# Patient Record
Sex: FEMALE | Race: WHITE | Employment: FULL TIME | URBAN - METROPOLITAN AREA
[De-identification: names, ages, dates, MRNs, and addresses within clinical notes are randomized per-mention and may not be internally consistent; named-entity substitution may affect disease eponyms.]

---

## 2019-02-18 ENCOUNTER — TELEPHONE (OUTPATIENT)
Dept: INTERNAL MEDICINE CLINIC | Facility: CLINIC | Age: 34
End: 2019-02-18

## 2019-02-18 NOTE — TELEPHONE ENCOUNTER
Incoming (mail or fax):  mail  Received from:  [facility]  Documentation given to:  Randi Moore

## 2019-02-22 NOTE — TELEPHONE ENCOUNTER
Chart forwarded to [employer] 2-22-19 for subpoena for records from [facility]. .  See med rec scan dated 2-22-19.

## 2020-01-08 ENCOUNTER — HOSPITAL ENCOUNTER (OUTPATIENT)
Age: 35
Discharge: HOME | End: 2020-01-08
Attending: FAMILY MEDICINE
Payer: COMMERCIAL

## 2020-01-08 VITALS
OXYGEN SATURATION: 97 % | HEART RATE: 74 BPM | TEMPERATURE: 98 F | DIASTOLIC BLOOD PRESSURE: 83 MMHG | RESPIRATION RATE: 16 BRPM | SYSTOLIC BLOOD PRESSURE: 135 MMHG

## 2020-01-08 DIAGNOSIS — J40 BRONCHITIS: Primary | ICD-10-CM

## 2020-01-08 DIAGNOSIS — J02.9 PHARYNGITIS, UNSPECIFIED ETIOLOGY: ICD-10-CM

## 2020-01-08 LAB — POCT RAPID STREP: NEGATIVE

## 2020-01-08 PROCEDURE — 99204 OFFICE O/P NEW MOD 45 MIN: CPT

## 2020-01-08 PROCEDURE — 87081 CULTURE SCREEN ONLY: CPT | Performed by: FAMILY MEDICINE

## 2020-01-08 PROCEDURE — 87430 STREP A AG IA: CPT | Performed by: FAMILY MEDICINE

## 2020-01-08 PROCEDURE — 99214 OFFICE O/P EST MOD 30 MIN: CPT

## 2020-01-08 RX ORDER — PREDNISONE 20 MG/1
40 TABLET ORAL DAILY
Qty: 10 TABLET | Refills: 0 | Status: SHIPPED | OUTPATIENT
Start: 2020-01-08 | End: 2020-01-13

## 2020-01-08 RX ORDER — BENZONATATE 200 MG/1
200 CAPSULE ORAL 3 TIMES DAILY PRN
Qty: 30 CAPSULE | Refills: 0 | Status: SHIPPED | OUTPATIENT
Start: 2020-01-08 | End: 2020-01-18

## 2020-01-08 NOTE — ED INITIAL ASSESSMENT (HPI)
Patient presents with cc of sore throat and productive cough x 2 days. Symptoms started on Sunday with sore throat. No fever. No flu shot. Coworkers ill with URI. Anterior chest pain with coughing.

## 2020-01-11 NOTE — ED PROVIDER NOTES
Patient Seen in: [facility]      History   Patient presents with:  Sore Throat  Cough    Stated Complaint: SORETHROAT AND  CHEST PAIN X 3 DAYS    HPI    37year old female presents for sore throat, cough and chest pain.  States s vital signs reviewed. All other systems reviewed and negative except as noted above.     Physical Exam     ED Triage Vitals [01/08/20 1122]   /83   Pulse 74   Resp 16   Temp 98.4 °F (36.9 °C)   Temp src Temporal   SpO2 97 %   O2 Device None (Room [facility]  [address] [place] [id number] [phone]    In 3 days  If symptoms worsen        Medications Prescribed:  Discharge Medication List as of 1/8/2020 11:46 AM    START taking these medications    predniSONE 20 MG Oral Tab  Take 2 tablets

## 2020-09-22 ENCOUNTER — HOSPITAL ENCOUNTER (OUTPATIENT)
Age: 35
Discharge: HOME | End: 2020-09-22
Payer: COMMERCIAL

## 2020-09-22 VITALS
OXYGEN SATURATION: 99 % | HEIGHT: 67 IN | TEMPERATURE: 99 F | WEIGHT: 230 LBS | RESPIRATION RATE: 20 BRPM | HEART RATE: 63 BPM | BODY MASS INDEX: 36.1 KG/M2 | DIASTOLIC BLOOD PRESSURE: 86 MMHG | SYSTOLIC BLOOD PRESSURE: 142 MMHG

## 2020-09-22 DIAGNOSIS — Z20.822 CLOSE EXPOSURE TO COVID-19 VIRUS: Primary | ICD-10-CM

## 2020-09-22 DIAGNOSIS — R53.83 FATIGUE, UNSPECIFIED TYPE: ICD-10-CM

## 2020-09-22 PROCEDURE — 99213 OFFICE O/P EST LOW 20 MIN: CPT

## 2020-09-23 NOTE — ED PROVIDER NOTES
Patient Seen in: [facility]      History   Patient presents with:  Testing    Stated Complaint: TL - body aches, congestion, HA; COVID exposure    HPI    Willard Marcellus is a 78-year-old female who presents today for evaluation of nasal stated complaint: TL - body aches, congestion, HA; COVID exposure  Other systems are as noted in HPI. Constitutional and vital signs reviewed. All other systems reviewed and negative except as noted above.     Physical Exam     ED Triage Vitals [09/22 in good condition throughout the visit today and remains so upon discharge. I discuss the plan of care with the patient, who expresses understanding. All questions and concerns are addressed to the patient's satisfaction prior to discharge today.     Disp

## 2020-09-25 LAB — SARS-COV-2 RNA,QUAL, RT-PCR: NOT DETECTED

## 2020-11-09 ENCOUNTER — APPOINTMENT (OUTPATIENT)
Dept: GENERAL RADIOLOGY | Age: 35
End: 2020-11-09
Attending: NURSE PRACTITIONER
Payer: COMMERCIAL

## 2020-11-09 ENCOUNTER — HOSPITAL ENCOUNTER (OUTPATIENT)
Age: 35
Discharge: HOME | End: 2020-11-09
Payer: COMMERCIAL

## 2020-11-09 VITALS
WEIGHT: 227 LBS | RESPIRATION RATE: 20 BRPM | HEIGHT: 67 IN | OXYGEN SATURATION: 96 % | BODY MASS INDEX: 35.63 KG/M2 | SYSTOLIC BLOOD PRESSURE: 121 MMHG | DIASTOLIC BLOOD PRESSURE: 72 MMHG | HEART RATE: 88 BPM | TEMPERATURE: 100 F

## 2020-11-09 DIAGNOSIS — Z20.822 FEVER WITH EXPOSURE TO COVID-19 VIRUS: Primary | ICD-10-CM

## 2020-11-09 DIAGNOSIS — R50.9 FEVER WITH EXPOSURE TO COVID-19 VIRUS: Primary | ICD-10-CM

## 2020-11-09 PROCEDURE — 99213 OFFICE O/P EST LOW 20 MIN: CPT

## 2020-11-09 PROCEDURE — 71046 X-RAY EXAM CHEST 2 VIEWS: CPT | Performed by: NURSE PRACTITIONER

## 2020-11-10 NOTE — ED PROVIDER NOTES
Patient Seen in: [facility]      History   Patient presents with:   Body ache and/or chills  Testing    Stated Complaint: Fever    HPI  Patient is a 24-year-old female past medical history of chronic rhinitis, thoracic outlet syndrome, sco reviewed. All other systems reviewed and negative except as noted above.     Physical Exam     ED Triage Vitals [11/09/20 1905]   BP (!) 148/91   Pulse 108   Resp 20   Temp 99.7 °F (37.6 °C)   Temp src Oral   SpO2 97 %   O2 Device None (Room air) pneumothorax. No pleural effusions. BONES:  Normal for age. CONCLUSION:  No acute cardiopulmonary process.     Dictated by (CST): Colbert Libman, MD on 11/09/2020 at 8:27 PM     Finalized by (CST): Colbert Libman, MD on 11/09/2020 at 8:28 PM

## 2020-11-10 NOTE — ED INITIAL ASSESSMENT (HPI)
Pt presents tonight with c/o fevers, body aches, and chills since last night. Pt states tmax 103. 3. Pt states that coworkers have tested + for covid.

## 2020-11-19 ENCOUNTER — TELEPHONE (OUTPATIENT)
Dept: INTERNAL MEDICINE CLINIC | Facility: CLINIC | Age: 35
End: 2020-11-19

## 2020-11-20 NOTE — TELEPHONE ENCOUNTER
Pt has me listed as pcp. Has never seen me and has not been seen in our office since 2016. Please check with pt if I am her pcp or if she has another pcp. Pt is covid positive. If I am her pcp she needs a virtual visit with me for follow up of covid. If I am not her pcp she should make a visit with her pcp for follow up. Please check with pt.

## 2020-12-01 NOTE — TELEPHONE ENCOUNTER
[facility] will make a 3rd outreach call in a few days then will start PCP removal process if we cannot get a hold of the patient.

## 2020-12-01 NOTE — TELEPHONE ENCOUNTER
Any updates on this? Please remove me as pcp if we are unable to get in touch with her. Has never seen me. Only saw Vic Hillman once in 2016 and there is an encounter after that stating she switched PCPs.

## (undated) NOTE — LETTER
Date & Time: 11/9/2020, 7:19 PM  Patient: Volodymyr Goldstein  Encounter Provider(s):    GUIDO Blancas       To Whom It May Concern:    Yudith Haines was seen and treated in our department on 11/9/2020.  She should not return to work until She receive